# Patient Record
Sex: MALE
[De-identification: names, ages, dates, MRNs, and addresses within clinical notes are randomized per-mention and may not be internally consistent; named-entity substitution may affect disease eponyms.]

---

## 2022-01-18 ENCOUNTER — NON-APPOINTMENT (OUTPATIENT)
Age: 77
End: 2022-01-18

## 2022-01-18 PROBLEM — Z00.00 ENCOUNTER FOR PREVENTIVE HEALTH EXAMINATION: Status: ACTIVE | Noted: 2022-01-18

## 2022-02-01 ENCOUNTER — APPOINTMENT (OUTPATIENT)
Dept: RHEUMATOLOGY | Facility: CLINIC | Age: 77
End: 2022-02-01
Payer: MEDICARE

## 2022-02-01 VITALS
SYSTOLIC BLOOD PRESSURE: 136 MMHG | RESPIRATION RATE: 18 BRPM | HEART RATE: 82 BPM | BODY MASS INDEX: 26.48 KG/M2 | WEIGHT: 185 LBS | DIASTOLIC BLOOD PRESSURE: 80 MMHG | HEIGHT: 70 IN | OXYGEN SATURATION: 93 %

## 2022-02-01 DIAGNOSIS — R76.8 OTHER SPECIFIED ABNORMAL IMMUNOLOGICAL FINDINGS IN SERUM: ICD-10-CM

## 2022-02-01 DIAGNOSIS — L40.50 ARTHROPATHIC PSORIASIS, UNSPECIFIED: ICD-10-CM

## 2022-02-01 PROCEDURE — 99204 OFFICE O/P NEW MOD 45 MIN: CPT | Mod: 25

## 2022-02-01 PROCEDURE — 36415 COLL VENOUS BLD VENIPUNCTURE: CPT

## 2022-02-01 NOTE — HISTORY OF PRESENT ILLNESS
[FreeTextEntry1] : 77 y/o male with hypothyroidism, diverticulitis, allergic rhinitis, ILD, OA, psoriasis referred to rheumatology for positive RF and DILIA.\par Pt had psoriasis of elbows and knees since 2005. Pt has had episodes of joint pains in the b/l UE since diagnosis of psoriasis. Reports that there were nail changes improved with biologics. Pt was previously on Cosentyx x 7-8 years. Patient switched to Taltz monthly x 2 years for joint pains/swelling related to psoriasis. Pt has not noticed major flares of joint pain/swelling since switched to Taltz.\par Pt has not hand any active psoriasis since he's been on biologics.\par \par Denies dactylitis, uveitis, enthesitis.\par Patient denies fatigue, fever, chills, chest pain, abdominal pain, cough, SOB, nausea, vomiting, diarrhea, blood in stool, hematuria, rash, Raynaud's, dry eyes, dry mouth, numbness/tingling, Hx of DVT/PEs.\par ROS negative unless otherwise noted above.\par \par WORKUP: \par Remarkable for (12/2021): ESR 21, HgbA1C 6.1%, RF 19, DILIA >1:1280\par Normal/neg (12/2021): CMP, TSH, thyroid panel, Vit D 25-OH. \par \par

## 2022-02-01 NOTE — ASSESSMENT
[FreeTextEntry1] : 75 y/o male with hypothyroidism, diverticulitis, allergic rhinitis, ILD, OA, psoriasis referred to rheumatology for positive RF and DILIA.\par Pt had psoriasis of elbows and knees since 2005. Pt has had episodes of joint pains in the b/l UE since diagnosis of psoriasis. Reports that there were nail changes improved with biologics. Pt was previously on Cosentyx x 7-8 years. Patient switched to Taltz monthly x 2 years for joint pains/swelling related to psoriasis. Pt has not noticed major flares of joint pain/swelling since switched to Taltz.\par Pt has not hand any active psoriasis since he's been on biologics.\par Denies dactylitis, uveitis, enthesitis.\par \par Patient likely has psoriatic arthritis based on Hx of psoriasis with inflammatory arthritis that is now controlled with Taltz. Pt does not have current inflammatory arthritis or other concerning symptoms. Pt presents for ESR 21 (would consider negative for his age), RF 19 (minimally positive, also increases with age), and DILIA >1:1280.\par As patient does not currently have signs of any other autoimmune inflammatory disease other than psoriatic arthritis, pt would like not meet criteria for other autoimmune rheum diseases. His inflammatory arthritis currently being well managed with Taltz. Any results of today's workup with likely not change pt's management.\par \par - Obtain labwork to evaluate for autoimmune inflammatory rheum diseases.\par - Continue with Taltz for psoriatic arthritis.\par - Will contact pt with results of above workup. If unremarkable, pt does not need to follow up regularly with rheumatology. RTC PRN.\par

## 2022-02-02 LAB
C3 SERPL-MCNC: 147 MG/DL
C4 SERPL-MCNC: 17 MG/DL
CRP SERPL-MCNC: 5 MG/L
ENA RNP AB SER IA-ACNC: 0.2 AL
ENA SM AB SER IA-ACNC: <0.2 AL
ENA SS-A AB SER IA-ACNC: 0.8 AL
ENA SS-B AB SER IA-ACNC: <0.2 AL
ERYTHROCYTE [SEDIMENTATION RATE] IN BLOOD BY WESTERGREN METHOD: 61 MM/HR
RHEUMATOID FACT SER QL: 23 IU/ML
THYROGLOB AB SERPL-ACNC: 36.5 IU/ML
THYROPEROXIDASE AB SERPL IA-ACNC: 439 IU/ML

## 2022-02-03 LAB
ANA PAT FLD IF-IMP: ABNORMAL
ANA SER IF-ACNC: ABNORMAL
CCP AB SER IA-ACNC: <8 UNITS
DSDNA AB SER-ACNC: 12 IU/ML
RF+CCP IGG SER-IMP: NEGATIVE

## 2022-02-16 ENCOUNTER — NON-APPOINTMENT (OUTPATIENT)
Age: 77
End: 2022-02-16

## 2022-02-16 LAB — HLA-B27 RELATED AG QL: NEGATIVE

## 2022-11-10 ENCOUNTER — APPOINTMENT (OUTPATIENT)
Dept: INTERVENTIONAL RADIOLOGY/VASCULAR | Facility: HOSPITAL | Age: 77
End: 2022-11-10

## 2022-12-02 ENCOUNTER — OFFICE (OUTPATIENT)
Dept: URBAN - METROPOLITAN AREA CLINIC 102 | Facility: CLINIC | Age: 77
Setting detail: OPHTHALMOLOGY
End: 2022-12-02
Payer: MEDICARE

## 2022-12-02 DIAGNOSIS — H25.12: ICD-10-CM

## 2022-12-02 PROCEDURE — 99211 OFF/OP EST MAY X REQ PHY/QHP: CPT | Performed by: OPHTHALMOLOGY

## 2022-12-02 ASSESSMENT — REFRACTION_MANIFEST
OS_VA1: 20/250
OD_VA1: 20/60-2
OD_AXIS: 143
OS_CYLINDER: -6.00
OD_CYLINDER: -0.75
OD_SPHERE: -3.50
OS_AXIS: 043
OS_SPHERE: -5.75
OD_VA1: 20/50
OD_SPHERE: -3.50
OS_CYLINDER: -1.75
OS_SPHERE: -9.00
OS_AXIS: 115
OD_CYLINDER: SPHERE

## 2022-12-02 ASSESSMENT — AXIALLENGTH_DERIVED
OD_AL: 25.4162
OD_AL: 25.4162
OS_AL: 28.5205
OS_AL: 28.5205
OS_AL: 27.894

## 2022-12-02 ASSESSMENT — REFRACTION_CURRENTRX
OS_OVR_VA: 20/
OD_SPHERE: PLANO
OD_AXIS: 108
OS_AXIS: 180
OD_OVR_VA: 20/
OS_SPHERE: +0.50
OD_CYLINDER: -0.25
OS_CYLINDER: 0.00

## 2022-12-02 ASSESSMENT — KERATOMETRY
OS_K2POWER_DIOPTERS: 43.00
OD_AXISANGLE_DEGREES: 053
OD_K1POWER_DIOPTERS: 42.75
OS_AXISANGLE_DEGREES: 130
METHOD_AUTO_MANUAL: AUTO
OD_K2POWER_DIOPTERS: 43.25
OS_K1POWER_DIOPTERS: 42.75

## 2022-12-02 ASSESSMENT — SPHEQUIV_DERIVED
OS_SPHEQUIV: -9.875
OS_SPHEQUIV: -8.75
OS_SPHEQUIV: -9.875
OD_SPHEQUIV: -3.875
OD_SPHEQUIV: -3.875

## 2022-12-02 ASSESSMENT — REFRACTION_AUTOREFRACTION
OS_CYLINDER: -1.75
OD_SPHERE: -3.50
OS_SPHERE: -9.00
OS_AXIS: 043
OD_CYLINDER: -0.75
OD_AXIS: 143

## 2022-12-02 ASSESSMENT — VISUAL ACUITY
OD_BCVA: 20/80-1
OS_BCVA: 20/40-2

## 2022-12-07 ENCOUNTER — ASC (OUTPATIENT)
Dept: URBAN - METROPOLITAN AREA SURGERY 8 | Facility: SURGERY | Age: 77
Setting detail: OPHTHALMOLOGY
End: 2022-12-07
Payer: MEDICARE

## 2022-12-07 DIAGNOSIS — H25.12: ICD-10-CM

## 2022-12-07 PROCEDURE — 66984 XCAPSL CTRC RMVL W/O ECP: CPT | Performed by: OPHTHALMOLOGY

## 2022-12-08 ENCOUNTER — OFFICE (OUTPATIENT)
Dept: URBAN - METROPOLITAN AREA CLINIC 102 | Facility: CLINIC | Age: 77
Setting detail: OPHTHALMOLOGY
End: 2022-12-08
Payer: MEDICARE

## 2022-12-08 VITALS — HEIGHT: 60 IN

## 2022-12-08 DIAGNOSIS — Z96.1: ICD-10-CM

## 2022-12-08 PROCEDURE — 99024 POSTOP FOLLOW-UP VISIT: CPT | Performed by: OPHTHALMOLOGY

## 2022-12-08 ASSESSMENT — LID POSITION - ECTROPION
OD_ECTROPION: T
OS_ECTROPION: T

## 2022-12-08 ASSESSMENT — CONFRONTATIONAL VISUAL FIELD TEST (CVF)
OS_FINDINGS: FULL
OD_FINDINGS: FULL

## 2022-12-08 ASSESSMENT — CORNEAL PTERYGIUM: OD_PTERYGIUM: TEMPORAL 1MM

## 2022-12-08 ASSESSMENT — TONOMETRY
OS_IOP_MMHG: 18
OD_IOP_MMHG: 12

## 2022-12-08 ASSESSMENT — CORNEAL EDEMA CLINICAL DESCRIPTION: OS_CORNEALEDEMA: T

## 2022-12-12 ASSESSMENT — REFRACTION_AUTOREFRACTION
OS_AXIS: 083
OD_CYLINDER: -1.00
OS_CYLINDER: -1.25
OS_SPHERE: -0.25
OD_AXIS: 140
OD_SPHERE: -3.50

## 2022-12-12 ASSESSMENT — REFRACTION_CURRENTRX
OD_SPHERE: PLANO
OS_OVR_VA: 20/
OD_OVR_VA: 20/
OD_AXIS: 108
OS_SPHERE: +0.50
OD_CYLINDER: -0.25
OS_AXIS: 180
OS_CYLINDER: 0.00

## 2022-12-12 ASSESSMENT — AXIALLENGTH_DERIVED
OD_AL: 25.3133
OS_AL: 27.83
OD_AL: 25.3133
OS_AL: 24.1284
OS_AL: 24.1284

## 2022-12-12 ASSESSMENT — KERATOMETRY
OD_K2POWER_DIOPTERS: 43.75
OS_K2POWER_DIOPTERS: 43.25
OD_K1POWER_DIOPTERS: 43.00
METHOD_AUTO_MANUAL: AUTO
OS_K1POWER_DIOPTERS: 42.75
OS_AXISANGLE_DEGREES: 041
OD_AXISANGLE_DEGREES: 054

## 2022-12-12 ASSESSMENT — REFRACTION_MANIFEST
OD_AXIS: 140
OD_VA1: 20/60
OS_CYLINDER: -6.00
OS_AXIS: 115
OD_SPHERE: -3.50
OD_SPHERE: -3.50
OS_VA1: 20/60-1
OS_AXIS: 083
OD_CYLINDER: -1.00
OD_VA1: 20/60-2
OS_CYLINDER: -1.25
OS_SPHERE: -5.75
OS_SPHERE: -0.25
OD_CYLINDER: SPHERE

## 2022-12-12 ASSESSMENT — SPHEQUIV_DERIVED
OD_SPHEQUIV: -4
OS_SPHEQUIV: -8.75
OD_SPHEQUIV: -4
OS_SPHEQUIV: -0.875
OS_SPHEQUIV: -0.875

## 2022-12-12 ASSESSMENT — VISUAL ACUITY
OS_BCVA: 20/50-
OD_BCVA: 20/50-

## 2022-12-15 ENCOUNTER — OFFICE (OUTPATIENT)
Dept: URBAN - METROPOLITAN AREA CLINIC 102 | Facility: CLINIC | Age: 77
Setting detail: OPHTHALMOLOGY
End: 2022-12-15
Payer: MEDICARE

## 2022-12-15 DIAGNOSIS — H25.11: ICD-10-CM

## 2022-12-15 PROCEDURE — 92136 OPHTHALMIC BIOMETRY: CPT | Performed by: OPHTHALMOLOGY

## 2022-12-15 ASSESSMENT — REFRACTION_MANIFEST
OD_CYLINDER: SPHERE
OS_CYLINDER: -1.25
OD_AXIS: 140
OD_VA1: 20/60-2
OD_SPHERE: -3.50
OS_SPHERE: -0.25
OS_AXIS: 115
OS_SPHERE: -5.75
OD_CYLINDER: -1.00
OS_CYLINDER: -6.00
OD_SPHERE: -3.50
OS_AXIS: 083
OD_VA1: 20/60
OS_VA1: 20/60-1

## 2022-12-15 ASSESSMENT — REFRACTION_CURRENTRX
OS_AXIS: 180
OS_CYLINDER: 0.00
OD_AXIS: 108
OS_OVR_VA: 20/
OS_SPHERE: +0.50
OD_CYLINDER: -0.25
OD_OVR_VA: 20/
OD_SPHERE: PLANO

## 2022-12-15 ASSESSMENT — SPHEQUIV_DERIVED
OD_SPHEQUIV: -3.875
OS_SPHEQUIV: 0.25
OS_SPHEQUIV: -0.875
OS_SPHEQUIV: -8.75
OD_SPHEQUIV: -4

## 2022-12-15 ASSESSMENT — AXIALLENGTH_DERIVED
OD_AL: 25.3101
OS_AL: 24.3709
OD_AL: 25.3663
OS_AL: 23.912
OS_AL: 28.1532

## 2022-12-15 ASSESSMENT — REFRACTION_AUTOREFRACTION
OS_AXIS: 087
OD_AXIS: 129
OD_SPHERE: -3.25
OD_CYLINDER: -1.25
OS_CYLINDER: -1.00
OS_SPHERE: +0.75

## 2022-12-15 ASSESSMENT — KERATOMETRY
OS_AXISANGLE_DEGREES: 173
METHOD_AUTO_MANUAL: AUTO
OD_K2POWER_DIOPTERS: 43.75
OS_K2POWER_DIOPTERS: 42.75
OS_K1POWER_DIOPTERS: 42.00
OD_K1POWER_DIOPTERS: 42.75
OD_AXISANGLE_DEGREES: 053

## 2022-12-15 ASSESSMENT — LID POSITION - ECTROPION
OS_ECTROPION: T
OD_ECTROPION: T

## 2022-12-15 ASSESSMENT — VISUAL ACUITY
OD_BCVA: 20/30-2
OS_BCVA: 20/150

## 2022-12-15 ASSESSMENT — CORNEAL PTERYGIUM: OD_PTERYGIUM: TEMPORAL 1MM

## 2022-12-15 ASSESSMENT — TONOMETRY: OS_IOP_MMHG: 11

## 2022-12-15 ASSESSMENT — CONFRONTATIONAL VISUAL FIELD TEST (CVF)
OS_FINDINGS: FULL
OD_FINDINGS: FULL

## 2022-12-16 ENCOUNTER — OFFICE (OUTPATIENT)
Dept: URBAN - METROPOLITAN AREA CLINIC 94 | Facility: CLINIC | Age: 77
Setting detail: OPHTHALMOLOGY
End: 2022-12-16
Payer: MEDICARE

## 2022-12-16 DIAGNOSIS — Z01.812: ICD-10-CM

## 2022-12-16 DIAGNOSIS — Z20.822: ICD-10-CM

## 2022-12-16 PROCEDURE — 99211 OFF/OP EST MAY X REQ PHY/QHP: CPT | Performed by: OPHTHALMOLOGY

## 2022-12-21 ENCOUNTER — ASC (OUTPATIENT)
Dept: URBAN - METROPOLITAN AREA SURGERY 8 | Facility: SURGERY | Age: 77
Setting detail: OPHTHALMOLOGY
End: 2022-12-21
Payer: MEDICARE

## 2022-12-21 DIAGNOSIS — H25.11: ICD-10-CM

## 2022-12-21 PROCEDURE — 66984 XCAPSL CTRC RMVL W/O ECP: CPT | Performed by: OPHTHALMOLOGY

## 2022-12-22 ENCOUNTER — RX ONLY (RX ONLY)
Age: 77
End: 2022-12-22

## 2022-12-22 ENCOUNTER — OFFICE (OUTPATIENT)
Dept: URBAN - METROPOLITAN AREA CLINIC 102 | Facility: CLINIC | Age: 77
Setting detail: OPHTHALMOLOGY
End: 2022-12-22
Payer: MEDICARE

## 2022-12-22 DIAGNOSIS — H25.11: ICD-10-CM

## 2022-12-22 DIAGNOSIS — Z96.1: ICD-10-CM

## 2022-12-22 PROCEDURE — 99024 POSTOP FOLLOW-UP VISIT: CPT | Performed by: OPHTHALMOLOGY

## 2022-12-22 ASSESSMENT — VISUAL ACUITY
OS_BCVA: 20/70
OD_BCVA: 20/20-1

## 2022-12-22 ASSESSMENT — REFRACTION_MANIFEST
OS_AXIS: 115
OD_CYLINDER: -1.00
OS_AXIS: 083
OS_CYLINDER: -6.00
OS_CYLINDER: -1.25
OD_CYLINDER: SPHERE
OD_SPHERE: -3.50
OS_SPHERE: -0.25
OD_SPHERE: -3.50
OD_AXIS: 140
OD_VA1: 20/60-2
OS_SPHERE: -5.75
OS_VA1: 20/60-1
OD_VA1: 20/60

## 2022-12-22 ASSESSMENT — REFRACTION_CURRENTRX
OD_SPHERE: PLANO
OS_OVR_VA: 20/
OD_AXIS: 108
OD_OVR_VA: 20/
OS_SPHERE: +0.50
OS_AXIS: 180
OS_CYLINDER: 0.00
OD_CYLINDER: -0.25

## 2022-12-22 ASSESSMENT — LID POSITION - ECTROPION
OS_ECTROPION: T
OD_ECTROPION: T

## 2022-12-22 ASSESSMENT — CONFRONTATIONAL VISUAL FIELD TEST (CVF)
OS_FINDINGS: FULL
OD_FINDINGS: FULL

## 2022-12-22 ASSESSMENT — SPHEQUIV_DERIVED
OS_SPHEQUIV: -0.875
OD_SPHEQUIV: -4
OD_SPHEQUIV: -0.125
OS_SPHEQUIV: -0.125
OS_SPHEQUIV: -8.75

## 2022-12-22 ASSESSMENT — CORNEAL EDEMA CLINICAL DESCRIPTION: OD_CORNEALEDEMA: T

## 2022-12-22 ASSESSMENT — KERATOMETRY
OD_AXISANGLE_DEGREES: 054
OS_K1POWER_DIOPTERS: 2.50
OD_K1POWER_DIOPTERS: 42.50
METHOD_AUTO_MANUAL: AUTO
OS_K2POWER_DIOPTERS: 43.00
OS_AXISANGLE_DEGREES: 045
OD_K2POWER_DIOPTERS: 43.75

## 2022-12-22 ASSESSMENT — AXIALLENGTH_DERIVED
OD_AL: 25.4195
OS_AL: 35.6111
OS_AL: 34.9575
OS_AL: 44.31
OD_AL: 23.7798

## 2022-12-22 ASSESSMENT — REFRACTION_AUTOREFRACTION
OD_CYLINDER: -1.75
OS_AXIS: 078
OS_CYLINDER: -0.75
OS_SPHERE: +0.25
OD_AXIS: 117
OD_SPHERE: +0.75

## 2022-12-22 ASSESSMENT — TONOMETRY
OS_IOP_MMHG: 10
OD_IOP_MMHG: 11

## 2022-12-22 ASSESSMENT — CORNEAL PTERYGIUM: OD_PTERYGIUM: TEMPORAL 1MM

## 2022-12-27 ENCOUNTER — OFFICE (OUTPATIENT)
Dept: URBAN - METROPOLITAN AREA CLINIC 12 | Facility: CLINIC | Age: 77
Setting detail: OPHTHALMOLOGY
End: 2022-12-27
Payer: MEDICARE

## 2022-12-27 DIAGNOSIS — Z96.1: ICD-10-CM

## 2022-12-27 PROCEDURE — 99024 POSTOP FOLLOW-UP VISIT: CPT | Performed by: OPTOMETRIST

## 2022-12-27 ASSESSMENT — KERATOMETRY
METHOD_AUTO_MANUAL: AUTO
OS_AXISANGLE_DEGREES: 172
OD_K2POWER_DIOPTERS: 44.00
OS_K1POWER_DIOPTERS: 42.25
OS_K2POWER_DIOPTERS: 42.75
OD_AXISANGLE_DEGREES: 004
OD_K1POWER_DIOPTERS: 42.75

## 2022-12-27 ASSESSMENT — REFRACTION_MANIFEST
OD_VA1: 20/60-2
OS_SPHERE: -0.25
OS_CYLINDER: -6.00
OS_CYLINDER: -1.25
OD_CYLINDER: SPHERE
OS_SPHERE: -5.75
OS_VA1: 20/60-1
OD_SPHERE: -3.50
OS_AXIS: 083
OD_VA1: 20/60
OD_AXIS: 140
OS_AXIS: 115
OD_SPHERE: -3.50
OD_CYLINDER: -1.00

## 2022-12-27 ASSESSMENT — REFRACTION_CURRENTRX
OS_AXIS: 180
OD_SPHERE: PLANO
OS_OVR_VA: 20/
OD_OVR_VA: 20/
OS_CYLINDER: 0.00
OD_AXIS: 108
OD_CYLINDER: -0.25
OS_SPHERE: +0.50

## 2022-12-27 ASSESSMENT — SPHEQUIV_DERIVED
OS_SPHEQUIV: -0.875
OS_SPHEQUIV: 0.125
OS_SPHEQUIV: -8.75
OD_SPHEQUIV: -4
OD_SPHEQUIV: 0

## 2022-12-27 ASSESSMENT — REFRACTION_AUTOREFRACTION
OD_AXIS: 118
OD_SPHERE: +0.50
OS_AXIS: 079
OS_CYLINDER: -1.25
OS_SPHERE: +0.75
OD_CYLINDER: -1.00

## 2022-12-27 ASSESSMENT — AXIALLENGTH_DERIVED
OD_AL: 25.3133
OS_AL: 24.322
OS_AL: 23.9149
OS_AL: 28.0879
OD_AL: 23.6379

## 2022-12-27 ASSESSMENT — TONOMETRY
OD_IOP_MMHG: 11
OS_IOP_MMHG: 11

## 2022-12-27 ASSESSMENT — CONFRONTATIONAL VISUAL FIELD TEST (CVF)
OD_FINDINGS: FULL
OS_FINDINGS: FULL

## 2022-12-27 ASSESSMENT — VISUAL ACUITY
OD_BCVA: 20/25
OS_BCVA: 20/30-2

## 2022-12-27 ASSESSMENT — LID POSITION - ECTROPION
OS_ECTROPION: T
OD_ECTROPION: T

## 2022-12-27 ASSESSMENT — CORNEAL PTERYGIUM: OD_PTERYGIUM: TEMPORAL 1MM

## 2022-12-27 ASSESSMENT — CORNEAL EDEMA CLINICAL DESCRIPTION: OD_CORNEALEDEMA: T

## 2023-01-04 PROBLEM — H40.031 NARROW ANGLE GLAUCOMA SUSPECT ;  , RIGHT EYE: Status: ACTIVE | Noted: 2022-12-15

## 2023-01-04 PROBLEM — Z96.1 PSEUDOPHAKIA-1 WEEK P/O CAT W/ IOL ; BOTH EYES: Status: ACTIVE | Noted: 2022-12-08

## 2023-01-04 ASSESSMENT — REFRACTION_CURRENTRX
OS_OVR_VA: 20/
OD_CYLINDER: -0.25
OS_AXIS: 180
OS_CYLINDER: 0.00
OS_SPHERE: +0.50
OD_SPHERE: PLANO
OD_AXIS: 108
OD_OVR_VA: 20/

## 2023-01-04 ASSESSMENT — KERATOMETRY
OD_K2POWER_DIOPTERS: 44.00
OD_AXISANGLE_DEGREES: 004
OD_K1POWER_DIOPTERS: 42.75
OS_K1POWER_DIOPTERS: 42.25
METHOD_AUTO_MANUAL: AUTO
OS_K2POWER_DIOPTERS: 42.75
OS_AXISANGLE_DEGREES: 172

## 2023-01-04 ASSESSMENT — AXIALLENGTH_DERIVED
OS_AL: 23.9149
OS_AL: 28.0879
OD_AL: 25.3133
OS_AL: 24.322
OD_AL: 23.6379

## 2023-01-04 ASSESSMENT — REFRACTION_MANIFEST
OS_SPHERE: -5.75
OD_VA1: 20/60
OD_VA1: 20/60-2
OS_AXIS: 083
OD_SPHERE: -3.50
OS_CYLINDER: -1.25
OS_SPHERE: -0.25
OD_CYLINDER: SPHERE
OS_CYLINDER: -6.00
OD_AXIS: 140
OS_AXIS: 115
OD_SPHERE: -3.50
OD_CYLINDER: -1.00
OS_VA1: 20/60-1

## 2023-01-04 ASSESSMENT — REFRACTION_AUTOREFRACTION
OS_AXIS: 079
OS_CYLINDER: -1.25
OD_SPHERE: +0.50
OS_SPHERE: +0.75
OD_CYLINDER: -1.00
OD_AXIS: 118

## 2023-01-04 ASSESSMENT — SPHEQUIV_DERIVED
OS_SPHEQUIV: -8.75
OS_SPHEQUIV: 0.125
OD_SPHEQUIV: 0
OD_SPHEQUIV: -4
OS_SPHEQUIV: -0.875

## 2023-01-04 ASSESSMENT — VISUAL ACUITY
OS_BCVA: 20/30-2
OD_BCVA: 20/25

## 2023-01-24 ENCOUNTER — OFFICE (OUTPATIENT)
Dept: URBAN - METROPOLITAN AREA CLINIC 102 | Facility: CLINIC | Age: 78
Setting detail: OPHTHALMOLOGY
End: 2023-01-24
Payer: MEDICARE

## 2023-01-24 DIAGNOSIS — Z96.1: ICD-10-CM

## 2023-01-24 DIAGNOSIS — H10.45: ICD-10-CM

## 2023-01-24 PROBLEM — H02.13 ECTROPION-SENILE; RIGHT LOWER LID, LEFT LOWER LID: Status: ACTIVE | Noted: 2023-01-24

## 2023-01-24 PROCEDURE — 99024 POSTOP FOLLOW-UP VISIT: CPT | Performed by: OPHTHALMOLOGY

## 2023-01-24 ASSESSMENT — REFRACTION_MANIFEST
OS_SPHERE: -5.75
OS_AXIS: 083
OD_SPHERE: -3.50
OD_CYLINDER: SPHERE
OD_VA1: 20/60
OS_VA1: 20/60-1
OS_AXIS: 115
OS_CYLINDER: -1.25
OD_SPHERE: -3.50
OS_SPHERE: -0.25
OS_CYLINDER: -6.00
OD_AXIS: 140
OD_CYLINDER: -1.00
OD_VA1: 20/60-2

## 2023-01-24 ASSESSMENT — SPHEQUIV_DERIVED
OS_SPHEQUIV: -8.75
OS_SPHEQUIV: 0
OS_SPHEQUIV: -0.875
OD_SPHEQUIV: 0.125
OD_SPHEQUIV: -4

## 2023-01-24 ASSESSMENT — AXIALLENGTH_DERIVED
OD_AL: 25.1557
OS_AL: 28.023
OS_AL: 23.9177
OS_AL: 24.2733
OD_AL: 23.4522

## 2023-01-24 ASSESSMENT — KERATOMETRY
OS_AXISANGLE_DEGREES: 002
OD_AXISANGLE_DEGREES: 43
OS_K2POWER_DIOPTERS: 42.75
OD_K2POWER_DIOPTERS: 44.00
OS_K1POWER_DIOPTERS: 42.50
METHOD_AUTO_MANUAL: AUTO
OD_K1POWER_DIOPTERS: 43.50

## 2023-01-24 ASSESSMENT — REFRACTION_CURRENTRX
OD_OVR_VA: 20/
OD_CYLINDER: -0.25
OD_AXIS: 108
OD_SPHERE: PLANO
OS_OVR_VA: 20/
OS_CYLINDER: 0.00
OS_SPHERE: +0.50
OS_AXIS: 180

## 2023-01-24 ASSESSMENT — REFRACTION_AUTOREFRACTION
OD_AXIS: 120
OS_SPHERE: +0.50
OS_CYLINDER: -1.00
OS_AXIS: 83
OD_CYLINDER: -0.75
OD_SPHERE: +0.50

## 2023-01-24 ASSESSMENT — LID POSITION - ECTROPION
OS_ECTROPION: T
OD_ECTROPION: T

## 2023-01-24 ASSESSMENT — CONFRONTATIONAL VISUAL FIELD TEST (CVF)
OS_FINDINGS: FULL
OD_FINDINGS: FULL

## 2023-01-24 ASSESSMENT — CORNEAL EDEMA CLINICAL DESCRIPTION: OD_CORNEALEDEMA: T

## 2023-01-24 ASSESSMENT — CORNEAL PTERYGIUM: OD_PTERYGIUM: TEMPORAL 1MM

## 2023-01-24 ASSESSMENT — VISUAL ACUITY
OS_BCVA: 20/25-1
OD_BCVA: 20/20-2

## 2023-04-27 ENCOUNTER — OFFICE (OUTPATIENT)
Dept: URBAN - METROPOLITAN AREA CLINIC 102 | Facility: CLINIC | Age: 78
Setting detail: OPHTHALMOLOGY
End: 2023-04-27
Payer: MEDICARE

## 2023-04-27 DIAGNOSIS — Z96.1: ICD-10-CM

## 2023-04-27 DIAGNOSIS — H11.041: ICD-10-CM

## 2023-04-27 DIAGNOSIS — H02.135: ICD-10-CM

## 2023-04-27 DIAGNOSIS — H10.45: ICD-10-CM

## 2023-04-27 DIAGNOSIS — H43.393: ICD-10-CM

## 2023-04-27 DIAGNOSIS — H02.132: ICD-10-CM

## 2023-04-27 PROCEDURE — 92014 COMPRE OPH EXAM EST PT 1/>: CPT | Performed by: OPHTHALMOLOGY

## 2023-04-27 ASSESSMENT — REFRACTION_MANIFEST
OD_VA1: 20/60-2
OD_CYLINDER: SPHERE
OD_SPHERE: -3.50
OS_AXIS: 115
OS_SPHERE: -5.75
OD_VA1: 20/60
OS_AXIS: 083
OD_AXIS: 140
OS_CYLINDER: -1.25
OD_SPHERE: -3.50
OD_CYLINDER: -1.00
OS_CYLINDER: -6.00
OS_SPHERE: -0.25
OS_VA1: 20/60-1

## 2023-04-27 ASSESSMENT — REFRACTION_AUTOREFRACTION
OD_CYLINDER: -0.75
OD_SPHERE: +0.50
OS_AXIS: 078
OS_CYLINDER: -1.00
OD_AXIS: 125
OS_SPHERE: +0.75

## 2023-04-27 ASSESSMENT — REFRACTION_CURRENTRX
OS_SPHERE: +0.50
OS_OVR_VA: 20/
OD_SPHERE: PLANO
OD_OVR_VA: 20/
OD_CYLINDER: -0.25
OS_CYLINDER: 0.00
OD_AXIS: 108
OS_AXIS: 180

## 2023-04-27 ASSESSMENT — KERATOMETRY
OS_AXISANGLE_DEGREES: 151
OD_K1POWER_DIOPTERS: 43.00
OS_K1POWER_DIOPTERS: 42.25
OS_K2POWER_DIOPTERS: 42.75
OD_K2POWER_DIOPTERS: 43.75
OD_AXISANGLE_DEGREES: 066
METHOD_AUTO_MANUAL: AUTO

## 2023-04-27 ASSESSMENT — SPHEQUIV_DERIVED
OS_SPHEQUIV: -8.75
OD_SPHEQUIV: 0.125
OS_SPHEQUIV: 0.25
OS_SPHEQUIV: -0.875
OD_SPHEQUIV: -4

## 2023-04-27 ASSESSMENT — LID POSITION - ECTROPION
OD_ECTROPION: T
OS_ECTROPION: T

## 2023-04-27 ASSESSMENT — AXIALLENGTH_DERIVED
OD_AL: 25.3133
OS_AL: 23.8649
OD_AL: 23.5891
OS_AL: 24.322
OS_AL: 28.0879

## 2023-04-27 ASSESSMENT — CONFRONTATIONAL VISUAL FIELD TEST (CVF)
OD_FINDINGS: FULL
OS_FINDINGS: FULL

## 2023-04-27 ASSESSMENT — CORNEAL PTERYGIUM: OD_PTERYGIUM: TEMPORAL 1MM

## 2023-04-27 ASSESSMENT — TONOMETRY: OS_IOP_MMHG: 10

## 2023-04-27 ASSESSMENT — CORNEAL EDEMA CLINICAL DESCRIPTION: OD_CORNEALEDEMA: T

## 2023-04-27 ASSESSMENT — VISUAL ACUITY
OS_BCVA: 20/25-2
OD_BCVA: 20/30-2

## 2023-12-14 ENCOUNTER — OFFICE (OUTPATIENT)
Dept: URBAN - METROPOLITAN AREA CLINIC 102 | Facility: CLINIC | Age: 78
Setting detail: OPHTHALMOLOGY
End: 2023-12-14
Payer: MEDICARE

## 2023-12-14 ENCOUNTER — RX ONLY (RX ONLY)
Age: 78
End: 2023-12-14

## 2023-12-14 DIAGNOSIS — H43.393: ICD-10-CM

## 2023-12-14 DIAGNOSIS — H02.132: ICD-10-CM

## 2023-12-14 DIAGNOSIS — H02.135: ICD-10-CM

## 2023-12-14 DIAGNOSIS — H52.4: ICD-10-CM

## 2023-12-14 DIAGNOSIS — H10.45: ICD-10-CM

## 2023-12-14 DIAGNOSIS — Z96.1: ICD-10-CM

## 2023-12-14 DIAGNOSIS — H11.041: ICD-10-CM

## 2023-12-14 PROBLEM — H52.7 REFRACTIVE ERROR: Status: ACTIVE | Noted: 2023-12-14

## 2023-12-14 PROCEDURE — 92014 COMPRE OPH EXAM EST PT 1/>: CPT | Performed by: OPHTHALMOLOGY

## 2023-12-14 PROCEDURE — 92015 DETERMINE REFRACTIVE STATE: CPT | Performed by: OPHTHALMOLOGY

## 2023-12-14 PROCEDURE — 92134 CPTRZ OPH DX IMG PST SGM RTA: CPT | Performed by: OPHTHALMOLOGY

## 2023-12-14 ASSESSMENT — SPHEQUIV_DERIVED
OD_SPHEQUIV: -4
OD_SPHEQUIV: -0.5
OS_SPHEQUIV: 0.125
OD_SPHEQUIV: -0.5
OS_SPHEQUIV: 0
OS_SPHEQUIV: -0.875

## 2023-12-14 ASSESSMENT — REFRACTION_MANIFEST
OS_AXIS: 083
OS_CYLINDER: -0.75
OS_AXIS: 080
OS_ADD: +2.50
OS_VA1: 20/60-1
OD_AXIS: 150
OD_VA1: 20/30
OS_VA1: 20/20
OD_SPHERE: -3.50
OD_SPHERE: -0.25
OS_SPHERE: +0.50
OS_SPHERE: -0.25
OD_CYLINDER: -1.00
OD_CYLINDER: -0.50
OD_VA1: 20/60
OD_ADD: +2.50
OD_AXIS: 140
OS_CYLINDER: -1.25

## 2023-12-14 ASSESSMENT — LID POSITION - ECTROPION
OD_ECTROPION: T
OS_ECTROPION: T

## 2023-12-14 ASSESSMENT — CORNEAL EDEMA CLINICAL DESCRIPTION: OD_CORNEALEDEMA: T

## 2023-12-14 ASSESSMENT — REFRACTION_AUTOREFRACTION
OS_SPHERE: +0.75
OD_SPHERE: -0.25
OS_AXIS: 081
OD_AXIS: 150
OS_CYLINDER: -1.50
OD_CYLINDER: -0.50

## 2023-12-14 ASSESSMENT — CONFRONTATIONAL VISUAL FIELD TEST (CVF)
OS_FINDINGS: FULL
OD_FINDINGS: FULL

## 2023-12-14 ASSESSMENT — REFRACTION_CURRENTRX
OS_CYLINDER: 0.00
OD_OVR_VA: 20/
OD_SPHERE: PLANO
OS_AXIS: 180
OD_CYLINDER: -0.25
OS_OVR_VA: 20/
OS_SPHERE: +0.50
OD_AXIS: 108

## 2023-12-14 ASSESSMENT — CORNEAL PTERYGIUM: OD_PTERYGIUM: TEMPORAL 1MM

## 2024-06-17 ENCOUNTER — OFFICE (OUTPATIENT)
Dept: URBAN - METROPOLITAN AREA CLINIC 102 | Facility: CLINIC | Age: 79
Setting detail: OPHTHALMOLOGY
End: 2024-06-17
Payer: MEDICARE

## 2024-06-17 DIAGNOSIS — H02.132: ICD-10-CM

## 2024-06-17 DIAGNOSIS — H11.041: ICD-10-CM

## 2024-06-17 DIAGNOSIS — H43.393: ICD-10-CM

## 2024-06-17 DIAGNOSIS — Z96.1: ICD-10-CM

## 2024-06-17 DIAGNOSIS — H10.45: ICD-10-CM

## 2024-06-17 DIAGNOSIS — H35.373: ICD-10-CM

## 2024-06-17 DIAGNOSIS — H02.135: ICD-10-CM

## 2024-06-17 PROCEDURE — 92134 CPTRZ OPH DX IMG PST SGM RTA: CPT | Performed by: OPHTHALMOLOGY

## 2024-06-17 PROCEDURE — 92014 COMPRE OPH EXAM EST PT 1/>: CPT | Performed by: OPHTHALMOLOGY

## 2024-06-17 ASSESSMENT — CONFRONTATIONAL VISUAL FIELD TEST (CVF)
OS_FINDINGS: FULL
OD_FINDINGS: FULL

## 2024-06-17 ASSESSMENT — LID POSITION - ECTROPION
OD_ECTROPION: T
OS_ECTROPION: T